# Patient Record
Sex: FEMALE | Race: WHITE | Employment: FULL TIME | URBAN - METROPOLITAN AREA
[De-identification: names, ages, dates, MRNs, and addresses within clinical notes are randomized per-mention and may not be internally consistent; named-entity substitution may affect disease eponyms.]

---

## 2024-02-27 ENCOUNTER — OFFICE VISIT (OUTPATIENT)
Dept: PRIMARY CARE | Facility: CLINIC | Age: 34
End: 2024-02-27
Payer: COMMERCIAL

## 2024-02-27 ENCOUNTER — HOSPITAL ENCOUNTER (OUTPATIENT)
Dept: RADIOLOGY | Facility: EXTERNAL LOCATION | Age: 34
Discharge: HOME | End: 2024-02-27

## 2024-02-27 ENCOUNTER — LAB (OUTPATIENT)
Dept: LAB | Facility: LAB | Age: 34
End: 2024-02-27
Payer: COMMERCIAL

## 2024-02-27 VITALS
TEMPERATURE: 97.8 F | WEIGHT: 138.6 LBS | DIASTOLIC BLOOD PRESSURE: 91 MMHG | HEART RATE: 97 BPM | OXYGEN SATURATION: 98 % | SYSTOLIC BLOOD PRESSURE: 135 MMHG

## 2024-02-27 DIAGNOSIS — M79.604 LEG PAIN, BILATERAL: ICD-10-CM

## 2024-02-27 DIAGNOSIS — M79.605 LEG PAIN, BILATERAL: ICD-10-CM

## 2024-02-27 DIAGNOSIS — M54.42 ACUTE BILATERAL LOW BACK PAIN WITH BILATERAL SCIATICA: ICD-10-CM

## 2024-02-27 DIAGNOSIS — M54.41 ACUTE BILATERAL LOW BACK PAIN WITH BILATERAL SCIATICA: ICD-10-CM

## 2024-02-27 DIAGNOSIS — R10.9 FLANK PAIN: ICD-10-CM

## 2024-02-27 DIAGNOSIS — T14.8XXA BRUISING: ICD-10-CM

## 2024-02-27 DIAGNOSIS — M79.605 LEG PAIN, BILATERAL: Primary | ICD-10-CM

## 2024-02-27 DIAGNOSIS — M79.604 LEG PAIN, BILATERAL: Primary | ICD-10-CM

## 2024-02-27 PROBLEM — L40.9 PSORIASIS: Status: ACTIVE | Noted: 2024-02-27

## 2024-02-27 LAB
ALBUMIN SERPL BCP-MCNC: 4.5 G/DL (ref 3.4–5)
ALP SERPL-CCNC: 36 U/L (ref 33–110)
ALT SERPL W P-5'-P-CCNC: 6 U/L (ref 7–45)
ANION GAP SERPL CALC-SCNC: 11 MMOL/L (ref 10–20)
APTT PPP: 29 SECONDS (ref 27–38)
AST SERPL W P-5'-P-CCNC: 11 U/L (ref 9–39)
BASOPHILS # BLD AUTO: 0.07 X10*3/UL (ref 0–0.1)
BASOPHILS NFR BLD AUTO: 1.3 %
BILIRUB SERPL-MCNC: 0.8 MG/DL (ref 0–1.2)
BUN SERPL-MCNC: 9 MG/DL (ref 6–23)
CALCIUM SERPL-MCNC: 9.5 MG/DL (ref 8.6–10.6)
CHLORIDE SERPL-SCNC: 107 MMOL/L (ref 98–107)
CK SERPL-CCNC: 39 U/L (ref 0–215)
CO2 SERPL-SCNC: 26 MMOL/L (ref 21–32)
CREAT SERPL-MCNC: 0.89 MG/DL (ref 0.5–1.05)
EGFRCR SERPLBLD CKD-EPI 2021: 88 ML/MIN/1.73M*2
EOSINOPHIL # BLD AUTO: 0.29 X10*3/UL (ref 0–0.7)
EOSINOPHIL NFR BLD AUTO: 5.3 %
ERYTHROCYTE [DISTWIDTH] IN BLOOD BY AUTOMATED COUNT: 12.2 % (ref 11.5–14.5)
ERYTHROCYTE [SEDIMENTATION RATE] IN BLOOD BY WESTERGREN METHOD: 4 MM/H (ref 0–20)
GLUCOSE SERPL-MCNC: 107 MG/DL (ref 74–99)
HCT VFR BLD AUTO: 41.9 % (ref 36–46)
HGB BLD-MCNC: 14.1 G/DL (ref 12–16)
IMM GRANULOCYTES # BLD AUTO: 0.02 X10*3/UL (ref 0–0.7)
IMM GRANULOCYTES NFR BLD AUTO: 0.4 % (ref 0–0.9)
INR PPP: 1 (ref 0.9–1.1)
LYMPHOCYTES # BLD AUTO: 1.42 X10*3/UL (ref 1.2–4.8)
LYMPHOCYTES NFR BLD AUTO: 26 %
MCH RBC QN AUTO: 30.9 PG (ref 26–34)
MCHC RBC AUTO-ENTMCNC: 33.7 G/DL (ref 32–36)
MCV RBC AUTO: 92 FL (ref 80–100)
MONOCYTES # BLD AUTO: 0.48 X10*3/UL (ref 0.1–1)
MONOCYTES NFR BLD AUTO: 8.8 %
NEUTROPHILS # BLD AUTO: 3.19 X10*3/UL (ref 1.2–7.7)
NEUTROPHILS NFR BLD AUTO: 58.2 %
NRBC BLD-RTO: 0 /100 WBCS (ref 0–0)
PLATELET # BLD AUTO: 317 X10*3/UL (ref 150–450)
POC APPEARANCE, URINE: CLEAR
POC BILIRUBIN, URINE: NEGATIVE
POC BLOOD, URINE: ABNORMAL
POC COLOR, URINE: YELLOW
POC GLUCOSE, URINE: NEGATIVE MG/DL
POC KETONES, URINE: NEGATIVE MG/DL
POC LEUKOCYTES, URINE: NEGATIVE
POC NITRITE,URINE: NEGATIVE
POC PH, URINE: 8.5 PH
POC PROTEIN, URINE: NEGATIVE MG/DL
POC SPECIFIC GRAVITY, URINE: 1.01
POC UROBILINOGEN, URINE: 0.2 EU/DL
POTASSIUM SERPL-SCNC: 3.9 MMOL/L (ref 3.5–5.3)
PROT SERPL-MCNC: 7.2 G/DL (ref 6.4–8.2)
PROTHROMBIN TIME: 11.7 SECONDS (ref 9.8–12.8)
RBC # BLD AUTO: 4.56 X10*6/UL (ref 4–5.2)
SODIUM SERPL-SCNC: 140 MMOL/L (ref 136–145)
WBC # BLD AUTO: 5.5 X10*3/UL (ref 4.4–11.3)

## 2024-02-27 PROCEDURE — 80053 COMPREHEN METABOLIC PANEL: CPT

## 2024-02-27 PROCEDURE — 85730 THROMBOPLASTIN TIME PARTIAL: CPT

## 2024-02-27 PROCEDURE — 85025 COMPLETE CBC W/AUTO DIFF WBC: CPT

## 2024-02-27 PROCEDURE — 82550 ASSAY OF CK (CPK): CPT

## 2024-02-27 PROCEDURE — 1036F TOBACCO NON-USER: CPT | Performed by: NURSE PRACTITIONER

## 2024-02-27 PROCEDURE — 81003 URINALYSIS AUTO W/O SCOPE: CPT | Performed by: NURSE PRACTITIONER

## 2024-02-27 PROCEDURE — 85652 RBC SED RATE AUTOMATED: CPT

## 2024-02-27 PROCEDURE — 99204 OFFICE O/P NEW MOD 45 MIN: CPT | Performed by: NURSE PRACTITIONER

## 2024-02-27 PROCEDURE — 85610 PROTHROMBIN TIME: CPT

## 2024-02-27 PROCEDURE — 87086 URINE CULTURE/COLONY COUNT: CPT

## 2024-02-27 PROCEDURE — 36415 COLL VENOUS BLD VENIPUNCTURE: CPT

## 2024-02-27 RX ORDER — ORPHENADRINE CITRATE 100 MG/1
100 TABLET, EXTENDED RELEASE ORAL 2 TIMES DAILY PRN
Qty: 60 TABLET | Refills: 5 | Status: SHIPPED | OUTPATIENT
Start: 2024-02-27 | End: 2024-08-25

## 2024-02-27 RX ORDER — PREDNISONE 20 MG/1
TABLET ORAL
Qty: 20 TABLET | Refills: 0 | Status: SHIPPED | OUTPATIENT
Start: 2024-02-27 | End: 2024-04-14 | Stop reason: ALTCHOICE

## 2024-02-27 ASSESSMENT — ENCOUNTER SYMPTOMS
FREQUENCY: 0
SHORTNESS OF BREATH: 0
DYSURIA: 0
CONSTITUTIONAL NEGATIVE: 1
BACK PAIN: 1
FLANK PAIN: 1
WHEEZING: 0

## 2024-02-27 ASSESSMENT — PATIENT HEALTH QUESTIONNAIRE - PHQ9
SUM OF ALL RESPONSES TO PHQ9 QUESTIONS 1 AND 2: 0
1. LITTLE INTEREST OR PLEASURE IN DOING THINGS: NOT AT ALL
2. FEELING DOWN, DEPRESSED OR HOPELESS: NOT AT ALL

## 2024-02-27 NOTE — PATIENT INSTRUCTIONS
Zyrtec 1 a day    Prednisone  Muscle relaxant  Xray lumbar  Xray femur bilat  Labs-cbc, coagulation labs, sed rate, ck  Urine for culture  Next: ortho referral and ultrasound legs if not starting to get better in 2d      I will communicate with you via [employer] regarding messages and results. If you need help with this, you can call the support line at [phone].    IT WAS A PLEASURE TO SEE YOU TODAY. THANK YOU FOR CHOOSING US FOR YOUR HEALTHCARE NEEDS.

## 2024-02-27 NOTE — PROGRESS NOTES
Subjective   Patient ID: Dalila Lee is a 33 y.o. female who presents for Rash.    new pt-kristian-last pcp, gi, ob gyn, others?  Obgyn- dr. Sears.   No derm dr    Any forms to fill out? No   last physical about 5 years ago   last labs 2 years ago   is pt fasting? No   Last tdap 5 years ago   last pap 5/19/22; due 5/2027    When did rash start? 1 week   Where is the rash located? Back of legs    Any other questions or concerns that you want to discuss today? Right ear checked- slight hearing loss and popping noises.     Family history form      HPI  Right ear with slight hearing loss and popping noises.   No runny nose, stuffy nose, post nasal drip, ST, ear pain, fever, chills  No seas allergies  No ear drainage  Selftxt:none    Pain in bilat legs-thigh more in back of legs x 1wk, worse x 2d  Has back pain  Walking, getting up from a seated position or standing makes leg pain worse  7/10 pain right now  Tingling/numbness down to feet on off  Rash back of both thighs x 1wk-flat rash  Rash moved to inner thighs  No pain to rash  No itchiness  No one around pt with a similar rash  No swelling of legs  Bruising front of thighs 1wk ago; no fall or injury  No discharge from rash  Feel like legs going to give out.  Denies uti sx  No incontinence  No calf pain  Selftxt: aleve    Started period today    No care team member to display     Review of Systems   Constitutional: Negative.    Respiratory:  Negative for shortness of breath and wheezing.    Cardiovascular:  Negative for chest pain.   Genitourinary:  Positive for flank pain. Negative for dysuria, frequency and urgency.   Musculoskeletal:  Positive for back pain.        Bilat upper leg pain mostly back of legs   Skin:  Positive for rash.       Objective   Visit Vitals  BP (!) 135/91   Pulse 97   Temp 36.6 °C (97.8 °F)      BP Readings from Last 3 Encounters:   02/27/24 (!) 135/91     Wt Readings from Last 3 Encounters:   02/27/24 62.9 kg (138 lb 9.6 oz)       Physical  Exam  Constitutional:       General: She is not in acute distress.     Appearance: Normal appearance.   Abdominal:      Tenderness: There is right CVA tenderness and left CVA tenderness.   Musculoskeletal:      Comments: Pain to palpate lower back; no redness rash or swelling  Pain back of thighs to get up from seated position and get onto the exam table.    Skin:     Comments: Back of rt thigh with macular red rash; no blisters or discharge. No swelling  No bruising noted on legs   Neurological:      Mental Status: She is alert.       Assessment/Plan   Diagnoses and all orders for this visit:  Leg pain, bilateral  -     CBC and Auto Differential; Future  -     Comprehensive Metabolic Panel; Future  -     CK; Future  -     Sedimentation Rate; Future  -     XR femur 2 VW bilateral; Future  Flank pain  -     Urine Culture; Future  -     POCT UA Automated manually resulted; Future  Acute bilateral low back pain with bilateral sciatica  -     predniSONE (Deltasone) 20 mg tablet; 3 tabs daily x3d then 2 tabs daily x 3d then 1 tab daily x 3d then 1/2 tab daily x 3d with food  -     orphenadrine (Norflex) 100 mg 12 hr tablet; Take 1 tablet (100 mg) by mouth 2 times a day as needed for muscle spasms or mild pain (1 - 3). Do not crush, chew, or split.  -     XR lumbar spine 2-3 views; Future  Bruising  -     CBC and Auto Differential; Future  -     APTT; Future  -     Protime-INR; Future  Other orders  -     Follow Up In Primary Care - Health Maintenance; Future

## 2024-02-28 ENCOUNTER — TELEPHONE (OUTPATIENT)
Dept: PRIMARY CARE | Facility: CLINIC | Age: 34
End: 2024-02-28
Payer: COMMERCIAL

## 2024-02-28 LAB — BACTERIA UR CULT: NORMAL

## 2024-02-28 NOTE — TELEPHONE ENCOUNTER
----- Message from OPAL Garcia-CNP sent at 2/28/2024  7:27 AM EST -----  Sugar (aka glucose), kidney function, liver function and electrolytes in the CMP (comprehensive metabolic panel) were normal. Pt was not fasting.  CBC (complete blood count) was normal which looks at infection and anemia markers.  Sed rate, a general inflammation marker, is normal.  Ck, a muscle breakdown lab test, is normal.   Coagulation labs normal.

## 2024-02-28 NOTE — TELEPHONE ENCOUNTER
----- Message from OPAL Garcia-CNP sent at 2/28/2024  2:45 PM EST -----  No uti noted.  [facility]

## 2024-02-29 ENCOUNTER — TELEPHONE (OUTPATIENT)
Dept: PRIMARY CARE | Facility: CLINIC | Age: 34
End: 2024-02-29
Payer: COMMERCIAL

## 2024-03-01 ENCOUNTER — TELEPHONE (OUTPATIENT)
Dept: PRIMARY CARE | Facility: CLINIC | Age: 34
End: 2024-03-01
Payer: COMMERCIAL

## 2024-03-01 NOTE — TELEPHONE ENCOUNTER
Pt informed  
Xrays-bilat femur and lumbar xrays normal.  Encourage pt to sign up for [facility]  
No significant past surgical history

## 2024-08-24 ENCOUNTER — APPOINTMENT (OUTPATIENT)
Dept: RADIOLOGY | Facility: HOSPITAL | Age: 34
End: 2024-08-24
Payer: COMMERCIAL

## 2024-08-24 ENCOUNTER — HOSPITAL ENCOUNTER (EMERGENCY)
Facility: HOSPITAL | Age: 34
Discharge: HOME | End: 2024-08-24
Attending: EMERGENCY MEDICINE
Payer: COMMERCIAL

## 2024-08-24 VITALS
OXYGEN SATURATION: 100 % | HEART RATE: 80 BPM | SYSTOLIC BLOOD PRESSURE: 133 MMHG | HEIGHT: 65 IN | RESPIRATION RATE: 18 BRPM | BODY MASS INDEX: 22.49 KG/M2 | TEMPERATURE: 97.5 F | DIASTOLIC BLOOD PRESSURE: 81 MMHG | WEIGHT: 135 LBS

## 2024-08-24 DIAGNOSIS — K80.20 CALCULUS OF GALLBLADDER WITHOUT CHOLECYSTITIS WITHOUT OBSTRUCTION: ICD-10-CM

## 2024-08-24 DIAGNOSIS — K29.00 ACUTE GASTRITIS WITHOUT HEMORRHAGE, UNSPECIFIED GASTRITIS TYPE: Primary | ICD-10-CM

## 2024-08-24 LAB
ALBUMIN SERPL BCP-MCNC: 4.2 G/DL (ref 3.4–5)
ALP SERPL-CCNC: 37 U/L (ref 33–110)
ALT SERPL W P-5'-P-CCNC: 7 U/L (ref 7–45)
ANION GAP SERPL CALC-SCNC: 13 MMOL/L (ref 10–20)
APPEARANCE UR: CLEAR
AST SERPL W P-5'-P-CCNC: 12 U/L (ref 9–39)
B-HCG SERPL-ACNC: <2 MIU/ML
BASOPHILS # BLD AUTO: 0.09 X10*3/UL (ref 0–0.1)
BASOPHILS NFR BLD AUTO: 1.2 %
BILIRUB SERPL-MCNC: 0.7 MG/DL (ref 0–1.2)
BILIRUB UR STRIP.AUTO-MCNC: NEGATIVE MG/DL
BUN SERPL-MCNC: 11 MG/DL (ref 6–23)
CALCIUM SERPL-MCNC: 9.3 MG/DL (ref 8.6–10.3)
CHLORIDE SERPL-SCNC: 106 MMOL/L (ref 98–107)
CO2 SERPL-SCNC: 22 MMOL/L (ref 21–32)
COLOR UR: NORMAL
CREAT SERPL-MCNC: 0.78 MG/DL (ref 0.5–1.05)
EGFRCR SERPLBLD CKD-EPI 2021: >90 ML/MIN/1.73M*2
EOSINOPHIL # BLD AUTO: 0.69 X10*3/UL (ref 0–0.7)
EOSINOPHIL NFR BLD AUTO: 8.8 %
ERYTHROCYTE [DISTWIDTH] IN BLOOD BY AUTOMATED COUNT: 12.1 % (ref 11.5–14.5)
GLUCOSE SERPL-MCNC: 89 MG/DL (ref 74–99)
GLUCOSE UR STRIP.AUTO-MCNC: NORMAL MG/DL
HCG UR QL IA.RAPID: NEGATIVE
HCT VFR BLD AUTO: 40 % (ref 36–46)
HGB BLD-MCNC: 14.3 G/DL (ref 12–16)
IMM GRANULOCYTES # BLD AUTO: 0.04 X10*3/UL (ref 0–0.7)
IMM GRANULOCYTES NFR BLD AUTO: 0.5 % (ref 0–0.9)
KETONES UR STRIP.AUTO-MCNC: NEGATIVE MG/DL
LEUKOCYTE ESTERASE UR QL STRIP.AUTO: NEGATIVE
LIPASE SERPL-CCNC: 14 U/L (ref 9–82)
LYMPHOCYTES # BLD AUTO: 1.97 X10*3/UL (ref 1.2–4.8)
LYMPHOCYTES NFR BLD AUTO: 25.2 %
MCH RBC QN AUTO: 32.6 PG (ref 26–34)
MCHC RBC AUTO-ENTMCNC: 35.8 G/DL (ref 32–36)
MCV RBC AUTO: 91 FL (ref 80–100)
MONOCYTES # BLD AUTO: 0.67 X10*3/UL (ref 0.1–1)
MONOCYTES NFR BLD AUTO: 8.6 %
NEUTROPHILS # BLD AUTO: 4.35 X10*3/UL (ref 1.2–7.7)
NEUTROPHILS NFR BLD AUTO: 55.7 %
NITRITE UR QL STRIP.AUTO: NEGATIVE
NRBC BLD-RTO: 0 /100 WBCS (ref 0–0)
PH UR STRIP.AUTO: 6.5 [PH]
PLATELET # BLD AUTO: 267 X10*3/UL (ref 150–450)
POTASSIUM SERPL-SCNC: 3.7 MMOL/L (ref 3.5–5.3)
PROT SERPL-MCNC: 7 G/DL (ref 6.4–8.2)
PROT UR STRIP.AUTO-MCNC: NEGATIVE MG/DL
RBC # BLD AUTO: 4.39 X10*6/UL (ref 4–5.2)
RBC # UR STRIP.AUTO: NEGATIVE /UL
SODIUM SERPL-SCNC: 137 MMOL/L (ref 136–145)
SP GR UR STRIP.AUTO: 1.02
UROBILINOGEN UR STRIP.AUTO-MCNC: NORMAL MG/DL
WBC # BLD AUTO: 7.8 X10*3/UL (ref 4.4–11.3)

## 2024-08-24 PROCEDURE — 96361 HYDRATE IV INFUSION ADD-ON: CPT

## 2024-08-24 PROCEDURE — 2500000004 HC RX 250 GENERAL PHARMACY W/ HCPCS (ALT 636 FOR OP/ED): Performed by: STUDENT IN AN ORGANIZED HEALTH CARE EDUCATION/TRAINING PROGRAM

## 2024-08-24 PROCEDURE — 81003 URINALYSIS AUTO W/O SCOPE: CPT | Performed by: EMERGENCY MEDICINE

## 2024-08-24 PROCEDURE — 76856 US EXAM PELVIC COMPLETE: CPT | Mod: FOREIGN READ | Performed by: RADIOLOGY

## 2024-08-24 PROCEDURE — 74177 CT ABD & PELVIS W/CONTRAST: CPT | Mod: FOREIGN READ | Performed by: RADIOLOGY

## 2024-08-24 PROCEDURE — 85025 COMPLETE CBC W/AUTO DIFF WBC: CPT | Performed by: EMERGENCY MEDICINE

## 2024-08-24 PROCEDURE — 96375 TX/PRO/DX INJ NEW DRUG ADDON: CPT

## 2024-08-24 PROCEDURE — 76830 TRANSVAGINAL US NON-OB: CPT | Mod: FOREIGN READ | Performed by: RADIOLOGY

## 2024-08-24 PROCEDURE — 96376 TX/PRO/DX INJ SAME DRUG ADON: CPT

## 2024-08-24 PROCEDURE — 84702 CHORIONIC GONADOTROPIN TEST: CPT | Performed by: EMERGENCY MEDICINE

## 2024-08-24 PROCEDURE — 36415 COLL VENOUS BLD VENIPUNCTURE: CPT | Performed by: EMERGENCY MEDICINE

## 2024-08-24 PROCEDURE — 96374 THER/PROPH/DIAG INJ IV PUSH: CPT | Mod: 59

## 2024-08-24 PROCEDURE — 80053 COMPREHEN METABOLIC PANEL: CPT | Performed by: EMERGENCY MEDICINE

## 2024-08-24 PROCEDURE — 83690 ASSAY OF LIPASE: CPT | Performed by: EMERGENCY MEDICINE

## 2024-08-24 PROCEDURE — 81025 URINE PREGNANCY TEST: CPT | Performed by: EMERGENCY MEDICINE

## 2024-08-24 PROCEDURE — 76830 TRANSVAGINAL US NON-OB: CPT

## 2024-08-24 PROCEDURE — 76705 ECHO EXAM OF ABDOMEN: CPT | Mod: FOREIGN READ | Performed by: RADIOLOGY

## 2024-08-24 PROCEDURE — 2500000004 HC RX 250 GENERAL PHARMACY W/ HCPCS (ALT 636 FOR OP/ED): Performed by: EMERGENCY MEDICINE

## 2024-08-24 PROCEDURE — 2550000001 HC RX 255 CONTRASTS: Performed by: EMERGENCY MEDICINE

## 2024-08-24 PROCEDURE — 74177 CT ABD & PELVIS W/CONTRAST: CPT

## 2024-08-24 PROCEDURE — 99285 EMERGENCY DEPT VISIT HI MDM: CPT | Mod: 25

## 2024-08-24 PROCEDURE — 76705 ECHO EXAM OF ABDOMEN: CPT

## 2024-08-24 RX ORDER — DICYCLOMINE HYDROCHLORIDE 10 MG/ML
20 INJECTION INTRAMUSCULAR ONCE
Status: DISCONTINUED | OUTPATIENT
Start: 2024-08-24 | End: 2024-08-24 | Stop reason: HOSPADM

## 2024-08-24 RX ORDER — ONDANSETRON 4 MG/1
4 TABLET, ORALLY DISINTEGRATING ORAL EVERY 8 HOURS PRN
Qty: 20 TABLET | Refills: 0 | Status: SHIPPED | OUTPATIENT
Start: 2024-08-24 | End: 2024-08-31

## 2024-08-24 RX ORDER — DICYCLOMINE HYDROCHLORIDE 20 MG/1
20 TABLET ORAL 2 TIMES DAILY PRN
Qty: 20 TABLET | Refills: 0 | Status: SHIPPED | OUTPATIENT
Start: 2024-08-24 | End: 2024-09-03

## 2024-08-24 RX ORDER — KETOROLAC TROMETHAMINE 30 MG/ML
15 INJECTION, SOLUTION INTRAMUSCULAR; INTRAVENOUS ONCE
Status: COMPLETED | OUTPATIENT
Start: 2024-08-24 | End: 2024-08-24

## 2024-08-24 RX ORDER — ONDANSETRON HYDROCHLORIDE 2 MG/ML
4 INJECTION, SOLUTION INTRAVENOUS ONCE
Status: COMPLETED | OUTPATIENT
Start: 2024-08-24 | End: 2024-08-24

## 2024-08-24 RX ORDER — MORPHINE SULFATE 4 MG/ML
4 INJECTION, SOLUTION INTRAMUSCULAR; INTRAVENOUS ONCE
Status: COMPLETED | OUTPATIENT
Start: 2024-08-24 | End: 2024-08-24

## 2024-08-24 RX ADMIN — MORPHINE SULFATE 4 MG: 4 INJECTION, SOLUTION INTRAMUSCULAR; INTRAVENOUS at 10:53

## 2024-08-24 RX ADMIN — SODIUM CHLORIDE 1000 ML: 9 INJECTION, SOLUTION INTRAVENOUS at 05:21

## 2024-08-24 RX ADMIN — IOHEXOL 75 ML: 350 INJECTION, SOLUTION INTRAVENOUS at 06:19

## 2024-08-24 RX ADMIN — MORPHINE SULFATE 4 MG: 4 INJECTION, SOLUTION INTRAMUSCULAR; INTRAVENOUS at 05:21

## 2024-08-24 RX ADMIN — ONDANSETRON 4 MG: 2 INJECTION INTRAMUSCULAR; INTRAVENOUS at 05:21

## 2024-08-24 RX ADMIN — KETOROLAC TROMETHAMINE 15 MG: 30 INJECTION, SOLUTION INTRAMUSCULAR at 05:21

## 2024-08-24 ASSESSMENT — LIFESTYLE VARIABLES
EVER HAD A DRINK FIRST THING IN THE MORNING TO STEADY YOUR NERVES TO GET RID OF A HANGOVER: NO
HAVE PEOPLE ANNOYED YOU BY CRITICIZING YOUR DRINKING: NO
TOTAL SCORE: 0
EVER FELT BAD OR GUILTY ABOUT YOUR DRINKING: NO
HAVE YOU EVER FELT YOU SHOULD CUT DOWN ON YOUR DRINKING: NO

## 2024-08-24 ASSESSMENT — COLUMBIA-SUICIDE SEVERITY RATING SCALE - C-SSRS
6. HAVE YOU EVER DONE ANYTHING, STARTED TO DO ANYTHING, OR PREPARED TO DO ANYTHING TO END YOUR LIFE?: NO
2. HAVE YOU ACTUALLY HAD ANY THOUGHTS OF KILLING YOURSELF?: NO
1. IN THE PAST MONTH, HAVE YOU WISHED YOU WERE DEAD OR WISHED YOU COULD GO TO SLEEP AND NOT WAKE UP?: NO

## 2024-08-24 ASSESSMENT — PAIN SCALES - GENERAL
PAINLEVEL_OUTOF10: 9
PAINLEVEL_OUTOF10: 6
PAINLEVEL_OUTOF10: 9
PAINLEVEL_OUTOF10: 2
PAINLEVEL_OUTOF10: 8

## 2024-08-24 ASSESSMENT — PAIN DESCRIPTION - LOCATION
LOCATION: ABDOMEN
LOCATION_2: BACK
LOCATION: ABDOMEN

## 2024-08-24 ASSESSMENT — PAIN DESCRIPTION - PAIN TYPE: TYPE: ACUTE PAIN

## 2024-08-24 ASSESSMENT — PAIN DESCRIPTION - ORIENTATION: ORIENTATION: RIGHT

## 2024-08-24 ASSESSMENT — PAIN - FUNCTIONAL ASSESSMENT: PAIN_FUNCTIONAL_ASSESSMENT: 0-10

## 2024-08-24 NOTE — PROGRESS NOTES
This is a 33-year-old female with no significant past medical history presents with abdominal pain.  The patient was received at [facility].  Please see the separate physician note for detail examination.  Patient developed severe abdominal pain starting yesterday.  Only mild nausea noted.  Review of labs were overall reassuring.  CT imaging showed some signs concerning for potential cholecystitis as well as some ovarian fluid.  Ultrasounds obtained which had shown cholelithiasis without any other signs of cholecystitis.  It did note having a positive Horan sign, however the patient is diffusely tender on her abdominal exam.  Transvaginal ultrasound was negative as well.  Patient was reassessed and stable.  She was hungry and was able to tolerate p.o. intake. Low suspicion for acute cholecystitis with this clinical picture and reassuring lab workup.  Will treat her symptomatically provided referral to general surgery.  Advised on symptomatic control at home.  Advised on return precautions and discharged.

## 2024-08-24 NOTE — ED TRIAGE NOTES
34 y/o female complains of  abdominal and back pain that began 45 minutes prior to arrival. Patient also states that she developed numbness to right leg and same time. States she took Aleve 20 minutes prior to arrival.

## 2024-08-26 ENCOUNTER — OFFICE VISIT (OUTPATIENT)
Dept: SURGERY | Facility: CLINIC | Age: 34
End: 2024-08-26
Payer: COMMERCIAL

## 2024-08-26 VITALS
HEIGHT: 65 IN | SYSTOLIC BLOOD PRESSURE: 124 MMHG | RESPIRATION RATE: 16 BRPM | TEMPERATURE: 98.1 F | HEART RATE: 106 BPM | OXYGEN SATURATION: 100 % | BODY MASS INDEX: 23.16 KG/M2 | WEIGHT: 139 LBS | DIASTOLIC BLOOD PRESSURE: 85 MMHG

## 2024-08-26 DIAGNOSIS — K80.20 CALCULUS OF GALLBLADDER WITHOUT CHOLECYSTITIS WITHOUT OBSTRUCTION: Primary | ICD-10-CM

## 2024-08-26 PROCEDURE — 99205 OFFICE O/P NEW HI 60 MIN: CPT | Performed by: SURGERY

## 2024-08-26 PROCEDURE — 3008F BODY MASS INDEX DOCD: CPT | Performed by: SURGERY

## 2024-08-26 RX ORDER — SODIUM CHLORIDE, SODIUM LACTATE, POTASSIUM CHLORIDE, CALCIUM CHLORIDE 600; 310; 30; 20 MG/100ML; MG/100ML; MG/100ML; MG/100ML
20 INJECTION, SOLUTION INTRAVENOUS CONTINUOUS
Status: CANCELLED | OUTPATIENT
Start: 2024-08-26

## 2024-08-26 RX ORDER — HEPARIN SODIUM 5000 [USP'U]/ML
5000 INJECTION, SOLUTION INTRAVENOUS; SUBCUTANEOUS ONCE
Status: CANCELLED | OUTPATIENT
Start: 2024-08-26 | End: 2024-08-26

## 2024-08-26 RX ORDER — CEFAZOLIN SODIUM 2 G/100ML
2 INJECTION, SOLUTION INTRAVENOUS ONCE
Status: CANCELLED | OUTPATIENT
Start: 2024-08-26 | End: 2024-08-26

## 2024-08-26 NOTE — PROGRESS NOTES
"History and Physical        Referring Provider: ED referral        Chief Complaint:   abdominal pain        History of Present Illness:  This is a 33-year-old female who presented to the emergency department 8/24/2024 with severe abdominal pain.  Imaging shows gallstones, without any other signs of cholecystitis.  She is still in a significant amount of pain with everything she eats.  She however denies nausea/vomiting, no fever/chills, she is constipated but no acholic stools.        Past Medical History:    Anxiety        Past Surgical History:   D&C x 2, wisdom tooth extraction        Medications:   as per medical record        Allergies:  No Known Drug Allergies        Family History:  The information was reviewed and no pertinent findings are relevant to the presenting problem.        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        Review of Systems  A complete 10 point review of systems was performed and is negative except as noted in the history of present illness.          Physical Exam:  /85 (BP Location: Right arm, Patient Position: Sitting, BP Cuff Size: Adult)   Pulse 106   Temp 36.7 °C (98.1 °F) (Temporal)   Resp 16   Ht 1.651 m (5' 5\")   Wt 63 kg (139 lb)   LMP 07/23/2024 (Exact Date)   SpO2 100%   BMI 23.13 kg/m²     General: No acute distress.  Neuro: Alert and oriented ×3. Follows commands.  Head: Atraumatic  Eyes: Pupils equal reactive to light. Extraocular motions intact.  Ears: Hears normal speaking voice.  Mouth, Nose, Throat: Mucous membranes moist.  Normal dentition.  Neck: Supple. No appreciable masses.  Breast: Not examined.  Chest: Nonlabored breathing, bilateral chest rise.  Heart: Palpable regular rate and rhythm.  Lung: Clear to auscultation bilaterally.  Vascular: Palpable radial pulses bilaterally.  Abdomen: Soft. " Nondistended.   Tender to palpation in the epigastrium and right upper quadrant.  No rebound/guarding. No appreciable hernias or scars.  Rectal: Not examined.  Genitourinary: Not examined.  Musculoskeletal: Moves all extremities.  Normal range of motion.  Lymphatic: No palpable lymph nodes.  Skin: No rashes or lesions.  Psychological: Normal affect        Labs:    Labs from 8/24/2024 are all within normal limits        Imaging: I have personally reviewed the images and the radiologist's report.   CT from 8/24/2024 shows distended gallbladder with subtle pericholecystic fat stranding.    Ultrasound shows cholelithiasis, no gallbladder wall thickening, biliary ductal dilatation, or pericholecystic fluid all indicating she does not have acute cholecystitis.           Assessment:    This is a 33-year-old female, otherwise healthy, with symptomatic cholelithiasis.  We talked about laparoscopic cholecystectomy, healing, and restrictions afterwards all questions were answered.     The  risks benefits and indications for surgery were discussed with the patient.  The potential of bleeding, infection, myocardial infarction, pulmonary embolism were reviewed with the patient.  The potential of an open procedure was discussed with the patient.  Strategies for dealing with common bile duct stones to include open, endoscopic, and laparoscopic procedures were reviewed in detail.  Anticipated convalescence was reviewed in detail.  All questions were answered and consent was obtained.     Plan:  -- We will plan for a laparoscopic cholecystectomy  On 8/28/2024 with follow-up 9/12/2024..  -- We will plan for surgery to be performed as an outpatient.  -- Avoid eating fatty food pre-operatively.  -- We will apply Dermabond, so the patient may shower the day after surgery.  No swimming or tub soaks for 4 weeks post-operatively.  -- No heavy lifting >20lbs for 6 weeks after surgery.        Jackie Brown MD MPH  General Surgery  Office:  [phone]  Fax: [phone]

## 2024-08-26 NOTE — H&P (VIEW-ONLY)
"History and Physical        Referring Provider: ED referral        Chief Complaint:   abdominal pain        History of Present Illness:  This is a 33-year-old female who presented to the emergency department 8/24/2024 with severe abdominal pain.  Imaging shows gallstones, without any other signs of cholecystitis.  She is still in a significant amount of pain with everything she eats.  She however denies nausea/vomiting, no fever/chills, she is constipated but no acholic stools.        Past Medical History:    Anxiety        Past Surgical History:   D&C x 2, wisdom tooth extraction        Medications:   as per medical record        Allergies:  No Known Drug Allergies        Family History:  The information was reviewed and no pertinent findings are relevant to the presenting problem.        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        Review of Systems  A complete 10 point review of systems was performed and is negative except as noted in the history of present illness.          Physical Exam:  /85 (BP Location: Right arm, Patient Position: Sitting, BP Cuff Size: Adult)   Pulse 106   Temp 36.7 °C (98.1 °F) (Temporal)   Resp 16   Ht 1.651 m (5' 5\")   Wt 63 kg (139 lb)   LMP 07/23/2024 (Exact Date)   SpO2 100%   BMI 23.13 kg/m²     General: No acute distress.  Neuro: Alert and oriented ×3. Follows commands.  Head: Atraumatic  Eyes: Pupils equal reactive to light. Extraocular motions intact.  Ears: Hears normal speaking voice.  Mouth, Nose, Throat: Mucous membranes moist.  Normal dentition.  Neck: Supple. No appreciable masses.  Breast: Not examined.  Chest: Nonlabored breathing, bilateral chest rise.  Heart: Palpable regular rate and rhythm.  Lung: Clear to auscultation bilaterally.  Vascular: Palpable radial pulses bilaterally.  Abdomen: Soft. " Nondistended.   Tender to palpation in the epigastrium and right upper quadrant.  No rebound/guarding. No appreciable hernias or scars.  Rectal: Not examined.  Genitourinary: Not examined.  Musculoskeletal: Moves all extremities.  Normal range of motion.  Lymphatic: No palpable lymph nodes.  Skin: No rashes or lesions.  Psychological: Normal affect        Labs:    Labs from 8/24/2024 are all within normal limits        Imaging: I have personally reviewed the images and the radiologist's report.   CT from 8/24/2024 shows distended gallbladder with subtle pericholecystic fat stranding.    Ultrasound shows cholelithiasis, no gallbladder wall thickening, biliary ductal dilatation, or pericholecystic fluid all indicating she does not have acute cholecystitis.           Assessment:    This is a 33-year-old female, otherwise healthy, with symptomatic cholelithiasis.  We talked about laparoscopic cholecystectomy, healing, and restrictions afterwards all questions were answered.     The  risks benefits and indications for surgery were discussed with the patient.  The potential of bleeding, infection, myocardial infarction, pulmonary embolism were reviewed with the patient.  The potential of an open procedure was discussed with the patient.  Strategies for dealing with common bile duct stones to include open, endoscopic, and laparoscopic procedures were reviewed in detail.  Anticipated convalescence was reviewed in detail.  All questions were answered and consent was obtained.     Plan:  -- We will plan for a laparoscopic cholecystectomy  On 8/28/2024 with follow-up 9/12/2024..  -- We will plan for surgery to be performed as an outpatient.  -- Avoid eating fatty food pre-operatively.  -- We will apply Dermabond, so the patient may shower the day after surgery.  No swimming or tub soaks for 4 weeks post-operatively.  -- No heavy lifting >20lbs for 6 weeks after surgery.        Jackie Brown MD MPH  General Surgery  Office:  [phone]  Fax: [phone]

## 2024-08-28 ENCOUNTER — ANESTHESIA (OUTPATIENT)
Dept: OPERATING ROOM | Facility: HOSPITAL | Age: 34
End: 2024-08-28
Payer: COMMERCIAL

## 2024-08-28 ENCOUNTER — APPOINTMENT (OUTPATIENT)
Dept: RADIOLOGY | Facility: HOSPITAL | Age: 34
End: 2024-08-28
Payer: COMMERCIAL

## 2024-08-28 ENCOUNTER — HOSPITAL ENCOUNTER (OUTPATIENT)
Facility: HOSPITAL | Age: 34
Setting detail: OUTPATIENT SURGERY
Discharge: HOME | End: 2024-08-28
Attending: SURGERY | Admitting: SURGERY
Payer: COMMERCIAL

## 2024-08-28 ENCOUNTER — ANESTHESIA EVENT (OUTPATIENT)
Dept: OPERATING ROOM | Facility: HOSPITAL | Age: 34
End: 2024-08-28
Payer: COMMERCIAL

## 2024-08-28 VITALS
HEIGHT: 65 IN | DIASTOLIC BLOOD PRESSURE: 77 MMHG | RESPIRATION RATE: 12 BRPM | HEART RATE: 70 BPM | BODY MASS INDEX: 23.16 KG/M2 | WEIGHT: 139 LBS | SYSTOLIC BLOOD PRESSURE: 105 MMHG | OXYGEN SATURATION: 100 % | TEMPERATURE: 98.2 F

## 2024-08-28 DIAGNOSIS — K80.20 CALCULUS OF GALLBLADDER WITHOUT CHOLECYSTITIS WITHOUT OBSTRUCTION: Primary | ICD-10-CM

## 2024-08-28 DIAGNOSIS — G89.18 POST-OP PAIN: ICD-10-CM

## 2024-08-28 LAB
ABO GROUP (TYPE) IN BLOOD: NORMAL
ANTIBODY SCREEN: NORMAL
HCG UR QL IA.RAPID: NEGATIVE
RH FACTOR (ANTIGEN D): NORMAL

## 2024-08-28 PROCEDURE — 3700000002 HC GENERAL ANESTHESIA TIME - EACH INCREMENTAL 1 MINUTE: Performed by: SURGERY

## 2024-08-28 PROCEDURE — 2500000004 HC RX 250 GENERAL PHARMACY W/ HCPCS (ALT 636 FOR OP/ED): Performed by: SURGERY

## 2024-08-28 PROCEDURE — 2500000004 HC RX 250 GENERAL PHARMACY W/ HCPCS (ALT 636 FOR OP/ED): Performed by: ANESTHESIOLOGY

## 2024-08-28 PROCEDURE — 36415 COLL VENOUS BLD VENIPUNCTURE: CPT | Performed by: SURGERY

## 2024-08-28 PROCEDURE — 74300 X-RAY BILE DUCTS/PANCREAS: CPT | Performed by: SURGERY

## 2024-08-28 PROCEDURE — 88304 TISSUE EXAM BY PATHOLOGIST: CPT | Performed by: STUDENT IN AN ORGANIZED HEALTH CARE EDUCATION/TRAINING PROGRAM

## 2024-08-28 PROCEDURE — 3600000003 HC OR TIME - INITIAL BASE CHARGE - PROCEDURE LEVEL THREE: Performed by: SURGERY

## 2024-08-28 PROCEDURE — C1887 CATHETER, GUIDING: HCPCS | Performed by: SURGERY

## 2024-08-28 PROCEDURE — 7100000010 HC PHASE TWO TIME - EACH INCREMENTAL 1 MINUTE: Performed by: SURGERY

## 2024-08-28 PROCEDURE — 2500000004 HC RX 250 GENERAL PHARMACY W/ HCPCS (ALT 636 FOR OP/ED): Performed by: NURSE ANESTHETIST, CERTIFIED REGISTERED

## 2024-08-28 PROCEDURE — 2720000007 HC OR 272 NO HCPCS: Performed by: SURGERY

## 2024-08-28 PROCEDURE — 7100000001 HC RECOVERY ROOM TIME - INITIAL BASE CHARGE: Performed by: SURGERY

## 2024-08-28 PROCEDURE — 96372 THER/PROPH/DIAG INJ SC/IM: CPT | Performed by: SURGERY

## 2024-08-28 PROCEDURE — 7100000009 HC PHASE TWO TIME - INITIAL BASE CHARGE: Performed by: SURGERY

## 2024-08-28 PROCEDURE — 3600000008 HC OR TIME - EACH INCREMENTAL 1 MINUTE - PROCEDURE LEVEL THREE: Performed by: SURGERY

## 2024-08-28 PROCEDURE — 88304 TISSUE EXAM BY PATHOLOGIST: CPT | Mod: TC,STJLAB | Performed by: SURGERY

## 2024-08-28 PROCEDURE — 3700000001 HC GENERAL ANESTHESIA TIME - INITIAL BASE CHARGE: Performed by: SURGERY

## 2024-08-28 PROCEDURE — 81025 URINE PREGNANCY TEST: CPT | Performed by: SURGERY

## 2024-08-28 PROCEDURE — 2500000001 HC RX 250 WO HCPCS SELF ADMINISTERED DRUGS (ALT 637 FOR MEDICARE OP): Performed by: ANESTHESIOLOGY

## 2024-08-28 PROCEDURE — 7100000002 HC RECOVERY ROOM TIME - EACH INCREMENTAL 1 MINUTE: Performed by: SURGERY

## 2024-08-28 PROCEDURE — 86901 BLOOD TYPING SEROLOGIC RH(D): CPT | Performed by: SURGERY

## 2024-08-28 PROCEDURE — 2500000005 HC RX 250 GENERAL PHARMACY W/O HCPCS: Performed by: NURSE ANESTHETIST, CERTIFIED REGISTERED

## 2024-08-28 PROCEDURE — 47563 LAPARO CHOLECYSTECTOMY/GRAPH: CPT | Performed by: SURGERY

## 2024-08-28 PROCEDURE — 2500000005 HC RX 250 GENERAL PHARMACY W/O HCPCS: Performed by: SURGERY

## 2024-08-28 PROCEDURE — 2500000004 HC RX 250 GENERAL PHARMACY W/ HCPCS (ALT 636 FOR OP/ED): Performed by: ANESTHESIOLOGIST ASSISTANT

## 2024-08-28 PROCEDURE — 2500000005 HC RX 250 GENERAL PHARMACY W/O HCPCS: Performed by: ANESTHESIOLOGIST ASSISTANT

## 2024-08-28 RX ORDER — DOCUSATE SODIUM 100 MG/1
200 CAPSULE, LIQUID FILLED ORAL DAILY
Qty: 30 CAPSULE | Refills: 0 | Status: SHIPPED | OUTPATIENT
Start: 2024-08-28 | End: 2024-09-12

## 2024-08-28 RX ORDER — OXYCODONE HYDROCHLORIDE 5 MG/1
5 TABLET ORAL ONCE
Status: COMPLETED | OUTPATIENT
Start: 2024-08-28 | End: 2024-08-28

## 2024-08-28 RX ORDER — HYDROMORPHONE HYDROCHLORIDE 1 MG/ML
INJECTION, SOLUTION INTRAMUSCULAR; INTRAVENOUS; SUBCUTANEOUS AS NEEDED
Status: DISCONTINUED | OUTPATIENT
Start: 2024-08-28 | End: 2024-08-28

## 2024-08-28 RX ORDER — SODIUM CHLORIDE, SODIUM LACTATE, POTASSIUM CHLORIDE, CALCIUM CHLORIDE 600; 310; 30; 20 MG/100ML; MG/100ML; MG/100ML; MG/100ML
20 INJECTION, SOLUTION INTRAVENOUS CONTINUOUS
Status: DISCONTINUED | OUTPATIENT
Start: 2024-08-28 | End: 2024-08-28 | Stop reason: HOSPADM

## 2024-08-28 RX ORDER — PROPOFOL 10 MG/ML
INJECTION, EMULSION INTRAVENOUS AS NEEDED
Status: DISCONTINUED | OUTPATIENT
Start: 2024-08-28 | End: 2024-08-28

## 2024-08-28 RX ORDER — KETOROLAC TROMETHAMINE 30 MG/ML
INJECTION, SOLUTION INTRAMUSCULAR; INTRAVENOUS AS NEEDED
Status: DISCONTINUED | OUTPATIENT
Start: 2024-08-28 | End: 2024-08-28

## 2024-08-28 RX ORDER — ONDANSETRON HYDROCHLORIDE 2 MG/ML
INJECTION, SOLUTION INTRAVENOUS AS NEEDED
Status: DISCONTINUED | OUTPATIENT
Start: 2024-08-28 | End: 2024-08-28

## 2024-08-28 RX ORDER — HYDROMORPHONE HYDROCHLORIDE 0.2 MG/ML
0.2 INJECTION INTRAMUSCULAR; INTRAVENOUS; SUBCUTANEOUS EVERY 5 MIN PRN
Status: DISCONTINUED | OUTPATIENT
Start: 2024-08-28 | End: 2024-08-28 | Stop reason: HOSPADM

## 2024-08-28 RX ORDER — MIDAZOLAM HYDROCHLORIDE 1 MG/ML
INJECTION, SOLUTION INTRAMUSCULAR; INTRAVENOUS AS NEEDED
Status: DISCONTINUED | OUTPATIENT
Start: 2024-08-28 | End: 2024-08-28

## 2024-08-28 RX ORDER — ACETAMINOPHEN 325 MG/1
650 TABLET ORAL ONCE
Status: DISCONTINUED | OUTPATIENT
Start: 2024-08-28 | End: 2024-08-28 | Stop reason: HOSPADM

## 2024-08-28 RX ORDER — ONDANSETRON HYDROCHLORIDE 2 MG/ML
4 INJECTION, SOLUTION INTRAVENOUS ONCE
Status: COMPLETED | OUTPATIENT
Start: 2024-08-28 | End: 2024-08-28

## 2024-08-28 RX ORDER — BUPIVACAINE HCL/EPINEPHRINE 0.5-1:200K
VIAL (ML) INJECTION AS NEEDED
Status: DISCONTINUED | OUTPATIENT
Start: 2024-08-28 | End: 2024-08-28 | Stop reason: HOSPADM

## 2024-08-28 RX ORDER — SODIUM CHLORIDE, SODIUM LACTATE, POTASSIUM CHLORIDE, CALCIUM CHLORIDE 600; 310; 30; 20 MG/100ML; MG/100ML; MG/100ML; MG/100ML
100 INJECTION, SOLUTION INTRAVENOUS CONTINUOUS
Status: DISCONTINUED | OUTPATIENT
Start: 2024-08-28 | End: 2024-08-28 | Stop reason: HOSPADM

## 2024-08-28 RX ORDER — FENTANYL CITRATE 50 UG/ML
INJECTION, SOLUTION INTRAMUSCULAR; INTRAVENOUS AS NEEDED
Status: DISCONTINUED | OUTPATIENT
Start: 2024-08-28 | End: 2024-08-28

## 2024-08-28 RX ORDER — LIDOCAINE HYDROCHLORIDE 10 MG/ML
0.1 INJECTION INFILTRATION; PERINEURAL ONCE
Status: DISCONTINUED | OUTPATIENT
Start: 2024-08-28 | End: 2024-08-28 | Stop reason: HOSPADM

## 2024-08-28 RX ORDER — METOCLOPRAMIDE HYDROCHLORIDE 5 MG/ML
10 INJECTION INTRAMUSCULAR; INTRAVENOUS ONCE
Status: COMPLETED | OUTPATIENT
Start: 2024-08-28 | End: 2024-08-28

## 2024-08-28 RX ORDER — OXYCODONE HYDROCHLORIDE 5 MG/1
5 TABLET ORAL EVERY 6 HOURS PRN
Qty: 5 TABLET | Refills: 0 | Status: SHIPPED | OUTPATIENT
Start: 2024-08-28

## 2024-08-28 RX ORDER — ROCURONIUM BROMIDE 50 MG/5 ML
SYRINGE (ML) INTRAVENOUS AS NEEDED
Status: DISCONTINUED | OUTPATIENT
Start: 2024-08-28 | End: 2024-08-28

## 2024-08-28 RX ORDER — LIDOCAINE HYDROCHLORIDE 20 MG/ML
INJECTION, SOLUTION EPIDURAL; INFILTRATION; INTRACAUDAL; PERINEURAL AS NEEDED
Status: DISCONTINUED | OUTPATIENT
Start: 2024-08-28 | End: 2024-08-28

## 2024-08-28 RX ORDER — CEFAZOLIN SODIUM 2 G/100ML
2 INJECTION, SOLUTION INTRAVENOUS ONCE
Status: COMPLETED | OUTPATIENT
Start: 2024-08-28 | End: 2024-08-28

## 2024-08-28 RX ORDER — HEPARIN SODIUM 5000 [USP'U]/ML
5000 INJECTION, SOLUTION INTRAVENOUS; SUBCUTANEOUS ONCE
Status: COMPLETED | OUTPATIENT
Start: 2024-08-28 | End: 2024-08-28

## 2024-08-28 SDOH — HEALTH STABILITY: MENTAL HEALTH: CURRENT SMOKER: 1

## 2024-08-28 ASSESSMENT — PAIN SCALES - GENERAL
PAINLEVEL_OUTOF10: 4
PAINLEVEL_OUTOF10: 9
PAINLEVEL_OUTOF10: 7
PAINLEVEL_OUTOF10: 8
PAINLEVEL_OUTOF10: 2
PAINLEVEL_OUTOF10: 3
PAINLEVEL_OUTOF10: 0 - NO PAIN
PAINLEVEL_OUTOF10: 4

## 2024-08-28 ASSESSMENT — PAIN - FUNCTIONAL ASSESSMENT
PAIN_FUNCTIONAL_ASSESSMENT: 0-10

## 2024-08-28 ASSESSMENT — COLUMBIA-SUICIDE SEVERITY RATING SCALE - C-SSRS
6. HAVE YOU EVER DONE ANYTHING, STARTED TO DO ANYTHING, OR PREPARED TO DO ANYTHING TO END YOUR LIFE?: NO
1. IN THE PAST MONTH, HAVE YOU WISHED YOU WERE DEAD OR WISHED YOU COULD GO TO SLEEP AND NOT WAKE UP?: NO
2. HAVE YOU ACTUALLY HAD ANY THOUGHTS OF KILLING YOURSELF?: NO

## 2024-08-28 NOTE — ANESTHESIA PROCEDURE NOTES
Airway  Date/Time: 8/28/2024 2:42 PM  Urgency: elective    Airway not difficult    Staffing  Performed: attending and resident   Authorized by: Raymond Fuentes DO    Performed by: SUSANNE Mendiola  Patient location during procedure: OR    Indications and Patient Condition  Indications for airway management: anesthesia  Spontaneous Ventilation: absent  Sedation level: deep  Preoxygenated: yes  Patient position: sniffing  MILS maintained throughout  Mask difficulty assessment: 1 - vent by mask  Planned trial extubation    Final Airway Details  Final airway type: endotracheal airway      Successful airway: ETT  Cuffed: yes   Successful intubation technique: direct laryngoscopy  Facilitating devices/methods: intubating stylet  Endotracheal tube insertion site: oral  Blade: Fabrice  Blade size: #3  ETT size (mm): 7.0  Cormack-Lehane Classification: grade I - full view of glottis  Placement verified by: chest auscultation and capnometry   Measured from: lips  ETT to lips (cm): 22  Number of attempts at approach: 1    Additional Comments  Lower lip abrasion caused by EM resident intubating

## 2024-08-28 NOTE — OP NOTE
Cholecystectomy Laparoscopy Operative Note     Date: 2024  OR Location: [facility] OR    Name: Dalila Lee : 1990, Age: 33 y.o., MRN: [id number], Sex: female    Diagnosis  Pre-op Diagnosis      * Calculus of gallbladder without cholecystitis without obstruction [K80.20] Post-op Diagnosis     * Calculus of gallbladder without cholecystitis without obstruction [K80.20]     Procedures  Cholecystectomy Laparoscopy  [address] - [place] LAPAROSCOPY SURG CHOLECYSTECTOMY      Surgeons      * Jackie Brown - Primary    Resident/Fellow/Other Assistant:  Surgeons and Role:  * No surgeons found with a matching role *    Procedure Summary  Anesthesia: General  ASA: III  Anesthesia Staff: Anesthesiologist: Raymond Fuentes DO  CRNA: OPAL Diggs-CRNA  C-AA: SUSANNE Mendiola  Estimated Blood Loss: 1mL  Intra-op Medications:   Administrations occurring from 1300 to 1510 on 24:   Medication Name Total Dose   lactated Ringer's infusion Cannot be calculated   ceFAZolin (Ancef) 2 g in dextrose (iso)  mL 2 g              Anesthesia Record               Intraprocedure I/O Totals       None           Specimen:   ID Type Source Tests Collected by Time   1 : gallbladder Tissue GALLBLADDER CHOLECYSTECTOMY SURGICAL PATHOLOGY EXAM Jackie Brown MD 2024 1502        Staff:   Circulator: Sarika  Scrub Person: Yana Zabala Circulator: Mikey Zabala Scrub: Rickeysha  Relief Scrub: Ladonna         Drains and/or Catheters: * None in log *    Tourniquet Times:         Implants:     Findings: Normal emeka. Uneventful cholangiogram.    Indications: Dalila Lee is an 33 y.o. female who is having surgery for Calculus of gallbladder without cholecystitis without obstruction [K80.20].     The risks benefits and indications for surgery were discussed with the patient. The potential of bleeding, infection, myocardial infarction, and pulmonary embolism were reviewed with the patient. The potential of an open procedure  was discussed with  the patient. Strategies for dealing with common bile duct stones to include open, endoscopic, and laparoscopic procedures were reviewed in detail. Anticipated convalescence was reviewed in detail. All questions were answered and consent was obtained.       DESCRIPTION OF PROCEDURE:   The patient was taken to the operating room and placed on the operative table in supine position.  Following induction of general anesthetic, the patient was intubated endotracheally.  Time-out had been performed per protocol, and he received preoperative  IV antibiotics and DVT prophylaxis.  Following intubation, an orogastric tube was placed, right arm was tucked, and the abdominal wall was prepped, and draped sterilely. The abdomen was entered via a 12 mm open Montelongo technique infraumbilically and insufflated with carbon dioxide to a pressure of 15 mmHg. Then she was placed in a reverse Trendelenburg position with right side up. A 12 mm subxiphoid port was next placed, and 5 mm right lateral subcostal and right lateral mid abdominal ports were placed under direct  visualization. The gallbladder was covered by omental adhesions, which were bluntly pealed away. The gallbladder was grasped at the fundus and  retracted cephalad.  The lateral aspect of the gallbladder was initially dissected identifying the infundibular cystic  duct junction.  With inferior and lateral retraction of the gallbladder, cystic duct and cystic artery were circumferentially dissected identifying the critical view with visualization of the liver between the 2 tubular structures.  The cystic artery was clipped thrice and  divided prior to intraoperative cholangiogram.  A clip was placed on the gallbladder side of the cystic duct before making a small ductotomy. Cholangiogram was performed and it showed patent ducts throughout the liver and down into the duodenum. The cystic duct had a generous cuff before entering the CBD. The cystic duct was  clipped twice distally and the ductotomy was completed. The cystic artery was already clipped and divided. The gallbladder was removed off of the gallbladder fossa with hook cautery and placed in endocatch  bag and brought out through the subxiphoid port. We then went back into the abdomen to irrigate and suction.  Evaluation of the area showed no evidence of bleeding or viscous or  bilious leakage. Ports were removed under direct visualization after desufflation. Subxiphoid and infraumbilical ports were closed at the fascial level with a figure of eight 0 Vicryl suture, 0 Vicryl was also used to place interrupted stitches to bring together the deep  dermis of the 12mm ports, and the skin was closed at all sites with subcuticular 4-0 Monocryl suture.  0.25% Marcaine with Epi was instilled. Dermabond applied.  Instrument, sponge, and needle counts were correct x2.  Surgeon and surgical assistant were present throughout  the entire procedure.  The patient was taken to the recovery room in stable condition.          Jackie Brown MD MPH   General Surgery   [phone]   Office: [phone]   Fax: [phone]           Complications:  None; patient tolerated the procedure well.    Disposition: PACU - hemodynamically stable.  Condition: stable         Additional Details: May shower tomorrow    Attending Attestation: I was present and scrubbed for the entire procedure.    Jackie Brown  Phone Number: [phone]

## 2024-08-28 NOTE — ANESTHESIA POSTPROCEDURE EVALUATION
Patient: Dalila Lee    Procedure Summary       Date: 08/28/24 Room / Location: [facility] OR 02 / [facility] OR    Anesthesia Start: 1433 Anesthesia Stop: 1557    Procedure: Cholecystectomy Laparoscopy Diagnosis:       Calculus of gallbladder without cholecystitis without obstruction      (Calculus of gallbladder without cholecystitis without obstruction [K80.20])    Surgeons: Jackie Brown MD Responsible Provider: Raymond Fuentes DO    Anesthesia Type: general ASA Status: 3            Anesthesia Type: general    Vitals Value Taken Time   /90 08/28/24 1552   Temp 36.7 08/28/24 1557   Pulse 112 08/28/24 1556   Resp 23 08/28/24 1556   SpO2 100 % 08/28/24 1556   Vitals shown include unfiled device data.    Anesthesia Post Evaluation    Patient location during evaluation: PACU  Patient participation: complete - patient participated  Level of consciousness: awake and alert  Pain management: adequate  Multimodal analgesia pain management approach  Airway patency: patent  Cardiovascular status: acceptable and blood pressure returned to baseline  Respiratory status: nonlabored ventilation, spontaneous ventilation, unassisted, acceptable and room air  Hydration status: balanced  Postoperative Nausea and Vomiting: none  Comments: Handoff to PACU RN        There were no known notable events for this encounter.

## 2024-08-28 NOTE — ANESTHESIA PREPROCEDURE EVALUATION
Patient: Dalila Lee    Procedure Information       Date/Time: 08/28/24 1300    Procedure: Cholecystectomy Laparoscopy    Location: [facility] OR 02 / [facility] OR    Surgeons: Jackie Brown MD            Relevant Problems   Liver   (+) Calculus of gallbladder without cholecystitis without obstruction       Clinical information reviewed:    Allergies  Meds               NPO Detail:  NPO/Void Status  Date of Last Liquid: 08/27/24  Time of Last Liquid: 0800  Date of Last Solid: 08/27/24  Time of Last Solid: 2100         Physical Exam    Airway  Mallampati: I  TM distance: >3 FB  Neck ROM: full     Cardiovascular - normal exam  Rhythm: regular     Dental    Pulmonary - normal exam  Breath sounds clear to auscultation     Abdominal            Anesthesia Plan    History of general anesthesia?: yes  History of complications of general anesthesia?: no    ASA 3     general     The patient is a current smoker.  Patient did not smoke on day of procedure.    intravenous induction   Postoperative administration of opioids is intended.  Anesthetic plan and risks discussed with patient.  Use of blood products discussed with patient who consented to blood products.    Plan discussed with attending.

## 2024-08-28 NOTE — DISCHARGE INSTRUCTIONS
1. Any questions regarding your surgery or procedure are always welcomed at my office, [phone]. Any questions about caring for the wound, convalescence, nausea, pain medication, intestinal function, or any other aspect of surgery should be directed to my office. I will provide any prescriptions for pain medication, antibiotics or any other procedure related needs. If there is skin glue over incisions, it will fall off in 1-2 weeks. If there is dressing you can remove the wound dressing on the second day after the surgery. After removing the dressing, the wound doesn't need to be recovered. Again, the wound may be open to the air. If keeping the wound covered makes you more comfortable, then the dressing should be changed daily. Antibiotic creams or ointments are not required. If you feel that Neosporin or similar product would be helpful, then it is okay to use them. If there is any question about the potential of an infection, then call my office. You can get the wound wet when the dressing is removed. A short shower or bath for the first week is allowed. I wouldn't soak in a hot tub or take a greater than 10 minute bath for the first week.  2. Any questions about your regular medications for blood pressure, diabetes, heart or breathing problems, cholesterol, arthritis or other baseline health issue, should be directed to your family doctor. I cannot provide prescription refills for these medications.  I will always try to review medications with you so there is no confusion.   3. In the long term, you can eat whatever you like. However for the first 24 to 48 hours after anesthesia, you should eat lightly. Two or three small meals is preferred over one big meal. Easy to digest items (liquids, soda, soup, toast, broth, jello) are suggested. A loss of appetite or even nausea is extremely common after any anesthesia. Avoid the Thanksgiving feast, pepperoni pizza and spicy foods for those first several days.   Pain pills, inactivity and convalescence can lead to constipation. I suggest taking Metamucil or other bulk fiber product to avoid this. Constipation should resolve as you get back to regular eating, regular activity and your regular schedule. If the Metamucil doesn't work and several days pass without a bowel movement, then a small dose of Milk of Magnesia (1-2 tablespoons) can be tried. Be careful about taking a second dose of MOM; you can rebound with significant loose stools.  4. I need to see you in the office at your scheduled follow up. At that time, I will check the wounds, review any X-ray or pathology reports, and make arrangements for any other needs.  9. Any paperwork relevant to the surgery such as FMLA, disability, insurance, estimates for return to work, etc., should be brought to my office to be filled out at the post-operative visit.  5. There is a prescription for pain medication. . For the first several days, I would encourage you to take the pain medicine regularly, even if you don't hurt a lot. Keeping a constant level of pain medicine in your system works much better than trying to catch up. After several days, you can reduce the amount of pain medicine as needed. You can take over the counter Tylenol or Ibuprofen (Motrin) in addition to the prescription pain pills. The combination of the pain pills and the Tylenol /Motrin works much better than either alone.  Don't drive while taking the narcotica. Also, don't drink alcoholic beverages while taking the pain pills.  6. If you have chest pain or shortness of breath go the emergency room. If there are questions about the wound, pain medication, swelling or redness, call the office first. Most issues can be solved without an ER visit. Call [phone].

## 2024-08-29 ENCOUNTER — TELEPHONE (OUTPATIENT)
Dept: SURGERY | Facility: CLINIC | Age: 34
End: 2024-08-29
Payer: COMMERCIAL

## 2024-08-29 DIAGNOSIS — U07.1 COVID: ICD-10-CM

## 2024-08-29 RX ORDER — NIRMATRELVIR AND RITONAVIR 300-100 MG
3 KIT ORAL 2 TIMES DAILY
Qty: 30 TABLET | Refills: 0 | Status: SHIPPED | OUTPATIENT
Start: 2024-08-29 | End: 2024-09-03

## 2024-08-29 NOTE — TELEPHONE ENCOUNTER
Pt called office to make us aware that she did a home covid test today because she started with a cough this morning and she has had body pain today and felt it was more then just post op pain.  Pt's home test is positive for COVID.  Pt instructed to increase her fluids, make sure she is taking tylenol for body aches and fevers.   Denies fevers at this time.

## 2024-08-29 NOTE — TELEPHONE ENCOUNTER
Pt called with concerns of pain, states she is having a lot of pain and pain medicine only helps for 1 hour.  Reviewed with pt making sure to use OTC pain medicine in addition to the narcotic and ice and that she can use heat to shoulders and back for gas pains.  Pt also admits to a scan amount of thin blood tinged drainage from incisions.  We reviewed wound care as well and also reviewed using the stool softeners and milk of magnesia if she does not have a bowel movement in 48 hours from surgery.  Pt also concerned that she only has 3 pain pills left.  I will notify Dr. Brown about pt's request for more pain medicine.  Pt expresses verbal understanding and agrees with plan of care.  Pt will call if she develops new or worsening symptoms.

## 2024-08-29 NOTE — ED PROVIDER NOTES
HPI   Chief Complaint   Patient presents with    Abdominal Pain    Back Pain    leg numbness       Chief complaint: Back pain, abdominal pain    History of present illness: Patient is a 33-year-old female with history of psoriasis presenting to the emergency department with complaints of back pain and abdominal pain.  According to the patient, approximately 45 minutes prior to arrival in the emergency department, patient had a relatively sudden onset of diffuse abdominal discomfort which radiates down her right leg.  The patient states that she had diaphoresis and nausea with this.  The patient denies any recent changes in her bowel or bladder habits she denies any dysuria or hematuria.  Patient denies ever having symptoms like this in the past.  Concern, the patient presents to the emergency department for further evaluation.      History provided by:  Patient   used: No            Patient History   No past medical history on file.  Past Surgical History:   Procedure Laterality Date    INDUCED       x 2     Family History   Problem Relation Name Age of Onset    Hypertension Father      Diabetes Father      Hyperlipidemia Father      Throat cancer Father      Depression Father's Sister      Other (heart surgery) Maternal Grandfather      Lung cancer Maternal Grandfather       Social History     Tobacco Use    Smoking status: Never    Smokeless tobacco: Never   Vaping Use    Vaping status: Every Day   Substance Use Topics    Alcohol use: Never    Drug use: Never       Physical Exam   ED Triage Vitals [24 0435]   Temperature Heart Rate Respirations BP   36.4 °C (97.5 °F) (!) 104 16 (!) 138/91      Pulse Ox Temp Source Heart Rate Source Patient Position   100 % Temporal Monitor Sitting      BP Location FiO2 (%)     Left arm --       Physical Exam  Constitutional:       Appearance: Normal appearance.   HENT:      Head: Normocephalic and atraumatic.      Right Ear: External ear normal.       Left Ear: External ear normal.      Nose: Nose normal.      Mouth/Throat:      Mouth: Mucous membranes are moist.   Eyes:      General: Lids are normal.      Extraocular Movements: Extraocular movements intact.      Pupils: Pupils are equal, round, and reactive to light.   Cardiovascular:      Rate and Rhythm: Normal rate and regular rhythm.      Heart sounds: Normal heart sounds.   Pulmonary:      Effort: Pulmonary effort is normal.      Breath sounds: Normal breath sounds.   Abdominal:      General: Abdomen is flat.      Palpations: Abdomen is soft.      Tenderness: There is generalized abdominal tenderness. There is no guarding or rebound.   Musculoskeletal:         General: No deformity. Normal range of motion.      Cervical back: Normal range of motion and neck supple.   Skin:     General: Skin is warm.      Capillary Refill: Capillary refill takes less than 2 seconds.      Coloration: Skin is not jaundiced.   Neurological:      General: No focal deficit present.      Mental Status: She is alert and oriented to person, place, and time.   Psychiatric:         Mood and Affect: Mood is anxious.         Behavior: Behavior normal.           ED Course & MDM   Diagnoses as of 08/28/24 2305   Acute gastritis without hemorrhage, unspecified gastritis type   Calculus of gallbladder without cholecystitis without obstruction                 No data recorded     [place] Coma Scale Score: 15 (08/24/24 0743 : Shelley Preciado RN)                           Medical Decision Making  Medical Decision Making: Patient remained stable during her time in the emergency department with pain.  CBC demonstrated no significant abnormalities Chem-7 and LFTs were all within normal limits lipase and hCG and urinalysis were all unremarkable.  CAT scan of the patient's head and pelvis with IV contrast demonstrated distended gallbladder with pericholecystic fat stranding versus small stone in the gallbladder neck and a corpus luteum cyst on  the right ovary but no other significant acute abnormalities.    Patient presents to the emergency department with complaints of abdominal discomfort.  Workup was performed as above which demonstrated the presence of pericholecystic fat stranding as well as an ovarian cyst.  At this time, the patient is relatively uncomfortable and as result I need to rule out the presence of ovarian torsion ultrasound of the pelvis as well as ultrasound to color were ordered meanwhile, the patient was given Zofran morphine ketorolac and IV hydration during her time in the emergency department.  The patient's case signed out to the oncoming physician please see their note for further evaluation and ultimate disposition from the emergency department.    Amount and/or Complexity of Data Reviewed  Labs: ordered. Decision-making details documented in ED Course.  Radiology: ordered. Decision-making details documented in ED Course.        Procedure  Procedures     Lex Stern MD  08/28/24 1746

## 2024-08-30 ENCOUNTER — APPOINTMENT (OUTPATIENT)
Dept: SURGERY | Facility: CLINIC | Age: 34
End: 2024-08-30
Payer: COMMERCIAL

## 2024-09-03 LAB
LABORATORY COMMENT REPORT: NORMAL
PATH REPORT.FINAL DX SPEC: NORMAL
PATH REPORT.GROSS SPEC: NORMAL
PATH REPORT.RELEVANT HX SPEC: NORMAL
PATH REPORT.TOTAL CANCER: NORMAL
RESIDENT REVIEW: NORMAL

## 2024-09-12 ENCOUNTER — APPOINTMENT (OUTPATIENT)
Dept: SURGERY | Facility: CLINIC | Age: 34
End: 2024-09-12
Payer: COMMERCIAL

## 2024-09-12 VITALS
BODY MASS INDEX: 23.16 KG/M2 | HEIGHT: 65 IN | OXYGEN SATURATION: 100 % | HEART RATE: 98 BPM | SYSTOLIC BLOOD PRESSURE: 126 MMHG | TEMPERATURE: 98 F | RESPIRATION RATE: 16 BRPM | DIASTOLIC BLOOD PRESSURE: 92 MMHG | WEIGHT: 139 LBS

## 2024-09-12 DIAGNOSIS — K80.20 CALCULUS OF GALLBLADDER WITHOUT CHOLECYSTITIS WITHOUT OBSTRUCTION: Primary | ICD-10-CM

## 2024-09-12 PROCEDURE — 3008F BODY MASS INDEX DOCD: CPT | Performed by: SURGERY

## 2024-09-12 PROCEDURE — 1036F TOBACCO NON-USER: CPT | Performed by: SURGERY

## 2024-09-12 PROCEDURE — 99024 POSTOP FOLLOW-UP VISIT: CPT | Performed by: SURGERY

## 2024-09-12 NOTE — PROGRESS NOTES
"Follow-Up Note        Referring Provider: ED        Chief Complaint:  Follow up from cholecystectomy        History of Present Illness:  This is a 33-year-old female who presents for follow-up after laparoscopic cholecystectomy on 8/28/2024.  She has been doing well since surgery.     Past Medical History:  Refer to H&P         Past Surgical History:  Refer to H&P         Medications:  Refer to H&P         Allergies:  Refer to H&P         Family History:  Refer to H&P         Social History:  Refer to H&P         Review of Systems  A complete 10 point review of systems was performed and is negative except as noted in the history of present illness.    Physical Exam:   BP (!) 126/92 (BP Location: Right arm, Patient Position: Sitting, BP Cuff Size: Adult)   Pulse 98   Temp 36.7 °C (98 °F) (Temporal)   Resp 16   Ht 1.651 m (5' 5\")   Wt 63 kg (139 lb)   LMP 07/23/2024 (Exact Date)   SpO2 100%   BMI 23.13 kg/m²     General: No acute distress. Sitting up in chair.  Neuro: Alert and oriented ×3. Follows commands.  Head: Atraumatic  Eyes: Pupils equal reactive to light. Extraocular motions intact.  Ears: Hears normal speaking voice.  Mouth, Nose, Throat: Mucous membranes moist.  Normal dentition.  Neck: Supple. No appreciable masses.  Chest: No crepitus.    Heart: Regular rate and rhythm.  Lung: Clear to auscultation bilaterally.  Vascular: No carotid bruits.  Palpable radial pulses bilaterally.  Abdomen: Soft. Nondistended. Nontender.  Well-healed laparoscopic port sites.  Musculoskeletal: Moves all extremities.  Normal range of motion.  Lymphatic: No palpable lymph nodes.  Skin: No rashes or lesions.  Psychological: Normal affect     Pathology: A. Gallbladder cholecystectomy:   -- Cholelithiasis  -- Benign lymph node.        Assessment:  This is a 33-year-old female who presents for follow up of a laparoscopic cholecystectomy for biliary colic. She has been doing very well since surgery.  She has no complaints.  " There is no evidence of any obvious complications.           Plan:  -- OK to swim.  -- No lifting anything heavier than 20 pounds for another 4 weeks, which is 6 weeks postoperative.  -- At this point, there is no specific need to follow up with me.  If she has any new questions or concerns, she may return at any time.        Jackie Brown MD MPH  General Surgery  Office: [phone]  Fax: [phone]

## 2025-05-22 ENCOUNTER — APPOINTMENT (OUTPATIENT)
Dept: OBSTETRICS AND GYNECOLOGY | Facility: CLINIC | Age: 35
End: 2025-05-22
Payer: COMMERCIAL

## 2025-05-22 VITALS — SYSTOLIC BLOOD PRESSURE: 120 MMHG | WEIGHT: 155.4 LBS | DIASTOLIC BLOOD PRESSURE: 90 MMHG | BODY MASS INDEX: 25.86 KG/M2

## 2025-05-22 DIAGNOSIS — Z87.42 HISTORY OF OVARIAN CYST: ICD-10-CM

## 2025-05-22 DIAGNOSIS — Z01.419 ENCOUNTER FOR ANNUAL ROUTINE GYNECOLOGICAL EXAMINATION: Primary | ICD-10-CM

## 2025-05-22 DIAGNOSIS — Z12.4 SCREENING FOR CERVICAL CANCER: ICD-10-CM

## 2025-05-22 DIAGNOSIS — N64.4 BREAST PAIN, LEFT: ICD-10-CM

## 2025-05-22 DIAGNOSIS — N93.9 ABNORMAL UTERINE BLEEDING (AUB): ICD-10-CM

## 2025-05-22 PROCEDURE — 1036F TOBACCO NON-USER: CPT | Performed by: OBSTETRICS & GYNECOLOGY

## 2025-05-22 PROCEDURE — 99385 PREV VISIT NEW AGE 18-39: CPT | Performed by: OBSTETRICS & GYNECOLOGY

## 2025-05-22 ASSESSMENT — PATIENT HEALTH QUESTIONNAIRE - PHQ9
1. LITTLE INTEREST OR PLEASURE IN DOING THINGS: NOT AT ALL
SUM OF ALL RESPONSES TO PHQ9 QUESTIONS 1 AND 2: 0
2. FEELING DOWN, DEPRESSED OR HOPELESS: NOT AT ALL

## 2025-05-22 ASSESSMENT — PAIN SCALES - GENERAL: PAINLEVEL_OUTOF10: 0-NO PAIN

## 2025-05-22 NOTE — PROGRESS NOTES
Assessment     PLAN  1. Encounter for annual routine gynecological examination (Primary)    2. History of ovarian cyst  - US PELVIS TRANSABDOMINAL WITH TRANSVAGINAL; Future    3. Abnormal uterine bleeding (AUB)  - recommend lab work up and pelvic ultrasound. She is trying to conceive so added AMH. Likely not ovulating given 6 week cycles. Follow up after labs and ultrasound to discuss plan moving forward. Offered ANG referral today or any other point if she desires.   - DHEA-Sulfate; Future  - Follicle Stimulating Hormone; Future  - Hemoglobin A1C; Future  - Prolactin; Future  - Testosterone,Free and Total; Future  - TSH with reflex to Free T4 if abnormal; Future  - QUEST ANTI-MULLERIAN HORMONE ([facility]), FEMALE; Future  - US PELVIS TRANSABDOMINAL WITH TRANSVAGINAL; Future    4. Screening for cervical cancer  - THINPREP PAP TEST (>30)    5. Breast pain, left  - lump palpable in left upper outer quadrant. Recommend diagnostic mammo and ultrasound  - BI mammo bilateral diagnostic tomosynthesis; Future  - BI US breast limited left; Future    Follow up after labs and ultrasound    Debbie Gutierrez MD      Subjective     Dalila Lee is a 34 y.o. female who is here for a routine exam and multiple concerns  PCP = OPAL Garcia-CNP    APE Concerns:   - started trying to conceive since 2024. Has OPK that are positive. Timing intercourse. 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   - feels lumps in both breasts. Left breast has been tender especially in the left upper outer quadrant      Gynecologic History:    OBHx:  (2 eab surgical)  Menses: Long cycle - around 6 weeks, used to be heavy and now lighter, 2-3 days duration, minimal pain   [place] [facility] [place]  HPV Vaccine: Done  History of Dysplasia: Denies  Sexually active: Active with   STI testing: Denies  Contraception: None, trying to conceive  Mammogram: thinks she had one at age 21yo  FamHx of GYN cancers:  cousin  had ovarian cancer at age 17yo, a paternal aunt with breast cancer and one maternal aunt with  breast cancer both over 49yo      PMH, PSH, FH, SH, medications and allergies reviewed and edited as needed.      Objective   /90 (BP Location: Left arm, Patient Position: Sitting, BP Cuff Size: Adult)   Wt 70.5 kg (155 lb 6.4 oz)   LMP 04/25/2025 (Approximate)   BMI 25.86 kg/m²      General:   Alert and oriented, in no acute distress   Neck: Supple. No visible thyromegaly.    Breast/Axilla: Small firm lump near nipple in left breast upper outer quadrant   Abdomen: Soft, non-tender, without masses or organomegaly   Vulva: Normal architecture without erythema, masses, or lesions.    Vagina: Normal mucosa without lesions, masses, or atrophy. No abnormal vaginal discharge.    Cervix: Normal without masses, lesions, or signs of cervicitis.    Uterus: Normal mobile, non-enlarged uterus    Adnexa: Normal without masses or lesions   Pelvic Floor No POP noted. No high tone pelvic floor   Psych Normal affect. Normal mood.

## 2025-06-06 LAB
DHEA-S SERPL-MCNC: 208 MCG/DL (ref 19–237)
EST. AVERAGE GLUCOSE BLD GHB EST-MCNC: 91 MG/DL
EST. AVERAGE GLUCOSE BLD GHB EST-SCNC: 5 MMOL/L
FSH SERPL-ACNC: 7.9 MIU/ML
HBA1C MFR BLD: 4.8 %
MIS SERPL-MCNC: 7.71 NG/ML (ref 0.36–10.07)
PROGEST SERPL-MCNC: <0.5 NG/ML
PROLACTIN SERPL-MCNC: 5 NG/ML
TESTOST FREE SERPL-MCNC: 1.9 PG/ML (ref 0.1–6.4)
TESTOST SERPL-MCNC: 23 NG/DL (ref 2–45)
TSH SERPL-ACNC: 1.82 MIU/L

## (undated) DEVICE — RETRIEVAL SYSTEM, MONARCH, 10MM DISP ENDOSCOPIC

## (undated) DEVICE — ADHESIVE, SKIN, DERMABOND ADVANCED, 15CM, PEN-STYLE

## (undated) DEVICE — APPLIER,  LIGACLIP, ENDO ROTATE, ROTATING, 10MM 20M/L, DISP

## (undated) DEVICE — TROCAR, OPTICAL, BLADELESS, 12MM, THREADED, 100MM LENGTH

## (undated) DEVICE — ASSEMBLY, STRYKER FLOW 2, SUCTION IRRIGATOR, WITH TIP

## (undated) DEVICE — CATHETER, LAPAROSCOPIC, CHOLANGIOGRAPHY

## (undated) DEVICE — Device

## (undated) DEVICE — SOLUTION, IRRIGATION, SODIUM CHLORIDE 0.9%, 1000 ML, POUR BOTTLE

## (undated) DEVICE — GLOVE, SURGICAL, BIOGEL, 6, PF, LATEX, GREEN

## (undated) DEVICE — TOWEL PACK, STERILE, 4/PACK, BLUE

## (undated) DEVICE — APPLICATOR, CHLORAPREP, W/ORANGE TINT, 26ML

## (undated) DEVICE — DRAPE, C-ARM IMAGE

## (undated) DEVICE — SUTURE, MONOCRYL, 4-0, 27 IN, PS-2, UNDYED

## (undated) DEVICE — GRASPER, LAPAROSCOPIC, DIRECT DRIVE, 5MM X35CM, DISP

## (undated) DEVICE — TROCAR, KII OPTICAL BLADELESS 5MM Z THREAD 100MM LNGTH

## (undated) DEVICE — SOLUTION, IRRIGATION, STERILE WATER, 1000 ML, POUR BOTTLE

## (undated) DEVICE — SUTURE, VICRYL, 0, 27 IN, UR-6, VIOLET

## (undated) DEVICE — TROCAR SYSTEM, BALLOON, KII GELPORT, 12 X 100MM

## (undated) DEVICE — SCISSORS, METZ, CURVED, 3/4 BLADE